# Patient Record
Sex: FEMALE | Race: WHITE | NOT HISPANIC OR LATINO | URBAN - METROPOLITAN AREA
[De-identification: names, ages, dates, MRNs, and addresses within clinical notes are randomized per-mention and may not be internally consistent; named-entity substitution may affect disease eponyms.]

---

## 2017-11-07 ENCOUNTER — APPOINTMENT (RX ONLY)
Dept: URBAN - METROPOLITAN AREA CLINIC 161 | Facility: CLINIC | Age: 72
Setting detail: DERMATOLOGY
End: 2017-11-07

## 2017-11-07 DIAGNOSIS — D18.0 HEMANGIOMA: ICD-10-CM

## 2017-11-07 DIAGNOSIS — L73.8 OTHER SPECIFIED FOLLICULAR DISORDERS: ICD-10-CM

## 2017-11-07 DIAGNOSIS — L57.0 ACTINIC KERATOSIS: ICD-10-CM

## 2017-11-07 DIAGNOSIS — L81.4 OTHER MELANIN HYPERPIGMENTATION: ICD-10-CM

## 2017-11-07 DIAGNOSIS — L82.1 OTHER SEBORRHEIC KERATOSIS: ICD-10-CM

## 2017-11-07 DIAGNOSIS — Z71.89 OTHER SPECIFIED COUNSELING: ICD-10-CM

## 2017-11-07 PROBLEM — I10 ESSENTIAL (PRIMARY) HYPERTENSION: Status: ACTIVE | Noted: 2017-11-07

## 2017-11-07 PROBLEM — L70.0 ACNE VULGARIS: Status: ACTIVE | Noted: 2017-11-07

## 2017-11-07 PROBLEM — D18.01 HEMANGIOMA OF SKIN AND SUBCUTANEOUS TISSUE: Status: ACTIVE | Noted: 2017-11-07

## 2017-11-07 PROBLEM — L55.1 SUNBURN OF SECOND DEGREE: Status: ACTIVE | Noted: 2017-11-07

## 2017-11-07 PROBLEM — D48.5 NEOPLASM OF UNCERTAIN BEHAVIOR OF SKIN: Status: ACTIVE | Noted: 2017-11-07

## 2017-11-07 PROCEDURE — 99203 OFFICE O/P NEW LOW 30 MIN: CPT | Mod: 25

## 2017-11-07 PROCEDURE — ? BIOPSY BY SHAVE METHOD

## 2017-11-07 PROCEDURE — 17000 DESTRUCT PREMALG LESION: CPT

## 2017-11-07 PROCEDURE — 69100 BIOPSY OF EXTERNAL EAR: CPT | Mod: 59

## 2017-11-07 PROCEDURE — 11100: CPT | Mod: 59

## 2017-11-07 PROCEDURE — ? DEFER

## 2017-11-07 PROCEDURE — ? REFERRAL CORRESPONDENCE

## 2017-11-07 PROCEDURE — ? LIQUID NITROGEN

## 2017-11-07 PROCEDURE — 11101: CPT

## 2017-11-07 PROCEDURE — ? COUNSELING

## 2017-11-07 ASSESSMENT — LOCATION DETAILED DESCRIPTION DERM
LOCATION DETAILED: SUPERIOR THORACIC SPINE
LOCATION DETAILED: LEFT CYMBA CONCHA
LOCATION DETAILED: LEFT LATERAL ZYGOMA
LOCATION DETAILED: LEFT SUPERIOR HELIX
LOCATION DETAILED: LEFT SUPERIOR MEDIAL MIDBACK
LOCATION DETAILED: RIGHT CENTRAL MALAR CHEEK
LOCATION DETAILED: RIGHT SUPERIOR HELIX
LOCATION DETAILED: RIGHT DISTAL RADIAL DORSAL FOREARM
LOCATION DETAILED: EPIGASTRIC SKIN
LOCATION DETAILED: RIGHT NASAL DORSUM

## 2017-11-07 ASSESSMENT — LOCATION ZONE DERM
LOCATION ZONE: EAR
LOCATION ZONE: FACE
LOCATION ZONE: ARM
LOCATION ZONE: TRUNK
LOCATION ZONE: NOSE

## 2017-11-07 ASSESSMENT — LOCATION SIMPLE DESCRIPTION DERM
LOCATION SIMPLE: LEFT EAR
LOCATION SIMPLE: UPPER BACK
LOCATION SIMPLE: RIGHT EAR
LOCATION SIMPLE: LEFT LOWER BACK
LOCATION SIMPLE: ABDOMEN
LOCATION SIMPLE: NOSE
LOCATION SIMPLE: LEFT ZYGOMA
LOCATION SIMPLE: RIGHT CHEEK
LOCATION SIMPLE: RIGHT FOREARM

## 2017-11-07 NOTE — PROCEDURE: BIOPSY BY SHAVE METHOD
Electrodesiccation Text: The wound bed was treated with electrodesiccation after the biopsy was performed.
Triangulation (Location Of Lesion In Relation To Distance From Anatomical Landmarks): 6mm
Type Of Destruction Used: Curettage
Destruction After The Procedure: No
Electrodesiccation And Curettage Text: The wound bed was treated with electrodesiccation and curettage after the biopsy was performed.
Curettage Text: The wound bed was treated with curettage after the biopsy was performed.
Consent: Written consent was obtained and risks were reviewed including but not limited to scarring, infection, bleeding, scabbing, incomplete removal, nerve damage and allergy to anesthesia.
Lab Facility: [id number]
Biopsy Method: Dermablade
Lab: [id number]
X Size Of Lesion In Cm: 0
Hemostasis: Drysol
Cryotherapy Text: The wound bed was treated with cryotherapy after the biopsy was performed.
Biopsy Type: H and E
Anesthesia Volume In Cc (Will Not Render If 0): 0.5
Triangulation (Location Of Lesion In Relation To Distance From Anatomical Landmarks): 5mm
Billing Type: Third-Party Bill
Lab Facility: [id number]
Triangulation (Location Of Lesion In Relation To Distance From Anatomical Landmarks): 4mm
Notification Instructions: Patient will be notified of biopsy results. However, patient instructed to call the office if not contacted within 2 weeks.
Size Of Lesion In Cm: 0.4
Size Of Lesion In Cm: 0.6
Dressing: bandage
Detail Level: Detailed
Anesthesia Type: 1% lidocaine with epinephrine
Wound Care: Bacitracin
Silver Nitrate Text: The wound bed was treated with silver nitrate after the biopsy was performed.
Lab: [id number]
Billing Type: Third-Party Bill
Post-Care Instructions: I reviewed with the patient in detail post-care instructions. Patient is to keep the biopsy site dry overnight, and then apply bacitracin twice daily until healed. Patient may apply hydrogen peroxide soaks to remove any crusting.
Lab Facility: [id number]

## 2017-11-07 NOTE — PROCEDURE: LIQUID NITROGEN
Detail Level: Zone
Consent: The patient's consent was obtained including but not limited to risks of crusting, scabbing, blistering, scarring, darker or lighter pigmentary change, recurrence, incomplete removal and infection.
Duration Of Freeze Thaw-Cycle (Seconds): 0
Render Post-Care Instructions In Note?: no

## 2017-11-07 NOTE — PROCEDURE: DEFER
Detail Level: Zone
Instructions (Optional): Refer to Yessi for PDT on face and ear rims X 2 treatments with double scrub and 2 hour incubation at least 1 month apart
Procedure To Be Performed At Next Visit: PDT Blue Light

## 2017-12-06 ENCOUNTER — APPOINTMENT (RX ONLY)
Dept: URBAN - METROPOLITAN AREA CLINIC 161 | Facility: CLINIC | Age: 72
Setting detail: DERMATOLOGY
End: 2017-12-06

## 2017-12-06 DIAGNOSIS — F42.4 EXCORIATION (SKIN-PICKING) DISORDER: ICD-10-CM

## 2017-12-06 DIAGNOSIS — L82.1 OTHER SEBORRHEIC KERATOSIS: ICD-10-CM

## 2017-12-06 DIAGNOSIS — L57.0 ACTINIC KERATOSIS: ICD-10-CM

## 2017-12-06 PROBLEM — S00.80XA UNSPECIFIED SUPERFICIAL INJURY OF OTHER PART OF HEAD, INITIAL ENCOUNTER: Status: ACTIVE | Noted: 2017-12-06

## 2017-12-06 PROCEDURE — ? PATIENT SPECIFIC COUNSELING

## 2017-12-06 PROCEDURE — ? LIQUID NITROGEN

## 2017-12-06 PROCEDURE — ? COUNSELING

## 2017-12-06 PROCEDURE — 17000 DESTRUCT PREMALG LESION: CPT

## 2017-12-06 PROCEDURE — 99212 OFFICE O/P EST SF 10 MIN: CPT | Mod: 25

## 2017-12-06 PROCEDURE — 17003 DESTRUCT PREMALG LES 2-14: CPT

## 2017-12-06 ASSESSMENT — LOCATION SIMPLE DESCRIPTION DERM
LOCATION SIMPLE: RIGHT ZYGOMA
LOCATION SIMPLE: LEFT ZYGOMA
LOCATION SIMPLE: RIGHT TEMPLE

## 2017-12-06 ASSESSMENT — LOCATION DETAILED DESCRIPTION DERM
LOCATION DETAILED: LEFT LATERAL ZYGOMA
LOCATION DETAILED: RIGHT CENTRAL TEMPLE
LOCATION DETAILED: RIGHT CENTRAL ZYGOMA
LOCATION DETAILED: RIGHT MEDIAL TEMPLE

## 2017-12-06 ASSESSMENT — LOCATION ZONE DERM: LOCATION ZONE: FACE

## 2017-12-06 NOTE — PROCEDURE: LIQUID NITROGEN
Render Post-Care Instructions In Note?: no
Consent: The patient's consent was obtained including but not limited to risks of crusting, scabbing, blistering, scarring, darker or lighter pigmentary change, recurrence, incomplete removal and infection.
Detail Level: Detailed
Duration Of Freeze Thaw-Cycle (Seconds): 0

## 2018-04-25 ENCOUNTER — APPOINTMENT (RX ONLY)
Dept: URBAN - METROPOLITAN AREA CLINIC 161 | Facility: CLINIC | Age: 73
Setting detail: DERMATOLOGY
End: 2018-04-25

## 2018-04-25 DIAGNOSIS — L81.4 OTHER MELANIN HYPERPIGMENTATION: ICD-10-CM

## 2018-04-25 DIAGNOSIS — Z85.828 PERSONAL HISTORY OF OTHER MALIGNANT NEOPLASM OF SKIN: ICD-10-CM

## 2018-04-25 DIAGNOSIS — L82.1 OTHER SEBORRHEIC KERATOSIS: ICD-10-CM

## 2018-04-25 DIAGNOSIS — L73.8 OTHER SPECIFIED FOLLICULAR DISORDERS: ICD-10-CM

## 2018-04-25 DIAGNOSIS — L57.0 ACTINIC KERATOSIS: ICD-10-CM

## 2018-04-25 PROBLEM — E05.90 THYROTOXICOSIS, UNSPECIFIED WITHOUT THYROTOXIC CRISIS OR STORM: Status: ACTIVE | Noted: 2018-04-25

## 2018-04-25 PROCEDURE — ? PATIENT SPECIFIC COUNSELING

## 2018-04-25 PROCEDURE — ? TREATMENT REGIMEN

## 2018-04-25 PROCEDURE — 99213 OFFICE O/P EST LOW 20 MIN: CPT

## 2018-04-25 PROCEDURE — ? OBSERVATION

## 2018-04-25 PROCEDURE — ? COUNSELING

## 2018-04-25 ASSESSMENT — LOCATION SIMPLE DESCRIPTION DERM
LOCATION SIMPLE: RIGHT PRETIBIAL REGION
LOCATION SIMPLE: LEFT LOWER BACK
LOCATION SIMPLE: RIGHT UPPER BACK
LOCATION SIMPLE: LEFT UPPER BACK
LOCATION SIMPLE: NOSE
LOCATION SIMPLE: LEFT POSTERIOR UPPER ARM
LOCATION SIMPLE: ABDOMEN
LOCATION SIMPLE: RIGHT HAND
LOCATION SIMPLE: RIGHT LOWER BACK
LOCATION SIMPLE: SCALP

## 2018-04-25 ASSESSMENT — LOCATION DETAILED DESCRIPTION DERM
LOCATION DETAILED: LEFT INFERIOR UPPER BACK
LOCATION DETAILED: RIGHT LATERAL ABDOMEN
LOCATION DETAILED: EPIGASTRIC SKIN
LOCATION DETAILED: LEFT SUPERIOR UPPER BACK
LOCATION DETAILED: LEFT PROXIMAL LATERAL POSTERIOR UPPER ARM
LOCATION DETAILED: RIGHT SUPERIOR MEDIAL MIDBACK
LOCATION DETAILED: LEFT INFERIOR LATERAL MIDBACK
LOCATION DETAILED: RIGHT SUPERIOR PARIETAL SCALP
LOCATION DETAILED: RIGHT NASAL DORSUM
LOCATION DETAILED: RIGHT DISTAL PRETIBIAL REGION
LOCATION DETAILED: RIGHT RADIAL DORSAL HAND
LOCATION DETAILED: RIGHT SUPERIOR UPPER BACK
LOCATION DETAILED: LEFT RIB CAGE

## 2018-04-25 ASSESSMENT — LOCATION ZONE DERM
LOCATION ZONE: ARM
LOCATION ZONE: LEG
LOCATION ZONE: NOSE
LOCATION ZONE: TRUNK
LOCATION ZONE: HAND
LOCATION ZONE: SCALP

## 2018-04-25 NOTE — PROCEDURE: TREATMENT REGIMEN
Plan: Patient is leaving and will be returning in the fall she plans to call Yessi to schedule PDT to the face in October
Detail Level: Zone

## 2018-04-25 NOTE — PROCEDURE: PATIENT SPECIFIC COUNSELING
Detail Level: Simple
NMSC have been txed by Dr Anderson and Dr Corea and has had 3 skin cancers on the scalp, most recent txed by Dr Corea with prolonged Efudex.\\nRec she f/u Dr Corea for final check in the fall, upon returning to [place].

## 2024-01-08 ENCOUNTER — OFFICE VISIT (OUTPATIENT)
Dept: URBAN - METROPOLITAN AREA CLINIC 54 | Facility: CLINIC | Age: 79
End: 2024-01-08
Payer: MEDICARE

## 2024-01-08 DIAGNOSIS — H35.363 DRUSEN (DEGENERATIVE) OF MACULA, BILATERAL: Primary | ICD-10-CM

## 2024-01-08 DIAGNOSIS — H43.813 VITREOUS DEGENERATION, BILATERAL: ICD-10-CM

## 2024-01-08 PROCEDURE — 99204 OFFICE O/P NEW MOD 45 MIN: CPT | Performed by: OPHTHALMOLOGY

## 2024-01-08 PROCEDURE — 92134 CPTRZ OPH DX IMG PST SGM RTA: CPT | Performed by: OPHTHALMOLOGY

## 2024-01-08 ASSESSMENT — INTRAOCULAR PRESSURE
OD: 12
OS: 14

## 2024-05-08 PROBLEM — Z00.00 ENCOUNTER FOR PREVENTIVE HEALTH EXAMINATION: Status: ACTIVE | Noted: 2024-05-08

## 2024-06-04 PROBLEM — I67.1 BRAIN ANEURYSM: Status: ACTIVE | Noted: 2024-06-04

## 2024-06-04 PROBLEM — K21.9 CHRONIC GERD: Status: RESOLVED | Noted: 2024-06-04 | Resolved: 2024-06-04

## 2024-06-04 PROBLEM — Z86.79 HISTORY OF PULMONARY HYPERTENSION: Status: RESOLVED | Noted: 2024-06-04 | Resolved: 2024-06-04

## 2024-06-04 PROBLEM — Z87.19 HISTORY OF ULCERATIVE COLITIS: Status: RESOLVED | Noted: 2024-06-04 | Resolved: 2024-06-04

## 2024-06-04 PROBLEM — Z86.79 HISTORY OF HEART FAILURE: Status: RESOLVED | Noted: 2024-06-04 | Resolved: 2024-06-04

## 2024-06-04 PROBLEM — Z86.79 HISTORY OF ESSENTIAL HYPERTENSION: Status: RESOLVED | Noted: 2024-06-04 | Resolved: 2024-06-04

## 2024-06-04 PROBLEM — G47.30 SLEEP APNEA: Status: RESOLVED | Noted: 2024-06-04 | Resolved: 2024-06-04

## 2024-06-04 PROBLEM — F32.A DEPRESSION: Status: RESOLVED | Noted: 2024-06-04 | Resolved: 2024-06-04

## 2024-06-04 PROBLEM — Z87.2 HISTORY OF ECZEMA: Status: RESOLVED | Noted: 2024-06-04 | Resolved: 2024-06-04

## 2024-06-04 PROBLEM — Z78.9 NEVER SMOKED TOBACCO: Status: ACTIVE | Noted: 2024-06-04

## 2024-06-04 PROBLEM — I63.9 STROKE: Status: RESOLVED | Noted: 2024-06-04 | Resolved: 2024-06-04

## 2024-06-04 PROBLEM — Z86.69 HISTORY OF BENIGN ESSENTIAL TREMOR: Status: RESOLVED | Noted: 2024-06-04 | Resolved: 2024-06-04

## 2024-06-04 PROBLEM — Z78.9 NEVER SMOKED CIGARETTES: Status: ACTIVE | Noted: 2024-06-04

## 2024-06-04 PROBLEM — Z86.39 HISTORY OF HYPOTHYROIDISM: Status: RESOLVED | Noted: 2024-06-04 | Resolved: 2024-06-04

## 2024-06-04 PROBLEM — M81.0 OSTEOPOROSIS: Status: RESOLVED | Noted: 2024-06-04 | Resolved: 2024-06-04

## 2024-06-04 PROBLEM — Z78.9 SOCIAL ALCOHOL USE: Status: ACTIVE | Noted: 2024-06-04

## 2024-06-04 RX ORDER — ESOMEPRAZOLE MAGNESIUM 40 MG/1
40 CAPSULE, DELAYED RELEASE ORAL
Refills: 0 | Status: ACTIVE | COMMUNITY

## 2024-06-04 RX ORDER — MULTIVIT-MIN/FOLIC/VIT K/LYCOP 400-300MCG
500 TABLET ORAL
Refills: 0 | Status: ACTIVE | COMMUNITY

## 2024-06-04 RX ORDER — SERTRALINE HYDROCHLORIDE 50 MG/1
50 TABLET, FILM COATED ORAL
Refills: 0 | Status: ACTIVE | COMMUNITY

## 2024-06-04 RX ORDER — LOSARTAN POTASSIUM 50 MG/1
50 TABLET, FILM COATED ORAL
Refills: 0 | Status: ACTIVE | COMMUNITY

## 2024-06-04 RX ORDER — ELECTROLYTES/DEXTROSE
SOLUTION, ORAL ORAL
Refills: 0 | Status: ACTIVE | COMMUNITY

## 2024-06-04 RX ORDER — MAGNESIUM 200 MG
200 TABLET ORAL
Refills: 0 | Status: ACTIVE | COMMUNITY

## 2024-06-04 RX ORDER — UBIDECARENONE 50 MG
600 CAPSULE ORAL
Refills: 0 | Status: ACTIVE | COMMUNITY

## 2024-06-04 RX ORDER — SPIRONOLACTONE 25 MG/1
25 TABLET ORAL
Refills: 0 | Status: ACTIVE | COMMUNITY

## 2024-06-04 RX ORDER — CYANOCOBALAMIN (VITAMIN B-12) 1000 MCG
TABLET ORAL
Refills: 0 | Status: ACTIVE | COMMUNITY

## 2024-06-04 RX ORDER — VEDOLIZUMAB 300 MG/5ML
300 INJECTION, POWDER, LYOPHILIZED, FOR SOLUTION INTRAVENOUS
Refills: 0 | Status: ACTIVE | COMMUNITY

## 2024-06-04 RX ORDER — RISEDRONATE SODIUM 129; 21 MG/1; MG/1
150 TABLET, FILM COATED ORAL
Refills: 0 | Status: ACTIVE | COMMUNITY

## 2024-06-04 RX ORDER — LEVOTHYROXINE SODIUM 112 UG/1
112 TABLET ORAL
Refills: 0 | Status: ACTIVE | COMMUNITY

## 2024-06-06 ENCOUNTER — APPOINTMENT (OUTPATIENT)
Dept: NEUROSURGERY | Facility: CLINIC | Age: 79
End: 2024-06-06
Payer: MEDICARE

## 2024-06-06 DIAGNOSIS — Z78.9 OTHER SPECIFIED HEALTH STATUS: ICD-10-CM

## 2024-06-06 DIAGNOSIS — Z86.79 PERSONAL HISTORY OF OTHER DISEASES OF THE CIRCULATORY SYSTEM: ICD-10-CM

## 2024-06-06 DIAGNOSIS — G47.30 SLEEP APNEA, UNSPECIFIED: ICD-10-CM

## 2024-06-06 DIAGNOSIS — K21.9 GASTRO-ESOPHAGEAL REFLUX DISEASE W/OUT ESOPHAGITIS: ICD-10-CM

## 2024-06-06 DIAGNOSIS — Z85.828 PERSONAL HISTORY OF OTHER MALIGNANT NEOPLASM OF SKIN: ICD-10-CM

## 2024-06-06 DIAGNOSIS — Z86.39 PERSONAL HISTORY OF OTHER ENDOCRINE, NUTRITIONAL AND METABOLIC DISEASE: ICD-10-CM

## 2024-06-06 DIAGNOSIS — I63.9 CEREBRAL INFARCTION, UNSPECIFIED: ICD-10-CM

## 2024-06-06 DIAGNOSIS — Z86.69 PERSONAL HISTORY OF OTHER DISEASES OF THE NERVOUS SYSTEM AND SENSE ORGANS: ICD-10-CM

## 2024-06-06 DIAGNOSIS — M81.0 AGE-RELATED OSTEOPOROSIS W/OUT CURRENT PATHOLOGICAL FRACTURE: ICD-10-CM

## 2024-06-06 DIAGNOSIS — Z87.2 PERSONAL HISTORY OF DISEASES OF THE SKIN AND SUBCUTANEOUS TISSUE: ICD-10-CM

## 2024-06-06 DIAGNOSIS — I67.1 CEREBRAL ANEURYSM, NONRUPTURED: ICD-10-CM

## 2024-06-06 DIAGNOSIS — F32.A DEPRESSION, UNSPECIFIED: ICD-10-CM

## 2024-06-06 DIAGNOSIS — Z87.19 PERSONAL HISTORY OF OTHER DISEASES OF THE DIGESTIVE SYSTEM: ICD-10-CM

## 2024-06-06 PROCEDURE — 99203 OFFICE O/P NEW LOW 30 MIN: CPT

## 2024-06-06 RX ORDER — APIXABAN 5 MG/1
5 TABLET, FILM COATED ORAL
Refills: 0 | Status: ACTIVE | COMMUNITY

## 2024-06-06 NOTE — ASSESSMENT
[FreeTextEntry1] : I, Dr. Callejas, personally performed the evaluation and management (E/M) services for this new patient who presents today to discuss treatment for aneurysm. That E/M includes conducting the examination, assessing all new/exacerbated conditions, and establishing a new plan of care. Today, my ACP, Zulma Whaley, was here to observe my evaluation and management services for this new problem/exacerbated condition to be followed going forward.  PLAN: - recommended angiogram to treat aneurysm. Possible craniotomy for clipping - PST paperwork provided to the pt and  and reviewed. They both verbalized understanding - Zakia Robbins to call Pt tomorrow to schedule Angio. She will need Cardiac clearance

## 2024-06-06 NOTE — ADDENDUM
[FreeTextEntry1] :   Patient Name: Tali Donohue   Chief Complaint (CC):   - Concern of increasing size of cerebral aneurysm   History of Present Illness:   - The patient is a 78-year-old female with significant past medical conditions like hip replacement, multiple skin cancer extractions, stroke-induced atrial fibrillation repair in 2020, along with an identified 2mm middle cerebral aneurysm in the same year. The aneurysm has increased in size from 2mm to 4mm this year. Current ongoing treatment includes infusions every eight weeks for ulcerative colitis. An attempt to coil the aneurysm at the [facility] was unsuccessful. Recent encounter with COVID due to exposure from visiting [place] where she required 3l oxygen support for a month.   Past Medical History (PMH):   - The patient has a history of hip replacement, skin cancer, stroke, cerebral aneurysm, ulcerative colitis, and COVID-19.   Family History (FH):   - Neither family history nor hereditary conditions mentioned in the conversation.   Social History (SH):   - The patient was an  by profession, currently lives in [place] during the winter and has two grown children. Never being a smoker, she lives healthily with her .   Medications:   - Current medications are: Actonel, ascorbic acid (Vitamin C), calcium, Cozaar, Cyanocobalamin, Entyvio (for UC), magnesium, multivitamin, Nexium, spironolactone, Zocor, Zoloft and Eliquis (for thrombus).   Allergies:   - No known allergies mentioned during the conversation.   Review of Systems (ROS):   - There are no symptoms of heart conditions such as shortness of breath or chest pain, nor any other diseases or symptoms mentioned or apparent.   Physical Examination (PE):   - Actual physical examination was not conducted during this conversation. Details can't be provided.   Laboratory/Data Review:   - FOB CAT scan was performed earlier this year, which evidenced enlargement of the cerebral aneurysm from 2 mm to 4 mm.   Assessment:   - A concern primarily revolves around the recently enlarged aneurysm in the left middle cerebral artery. This, in context with patient's history of stroke, and unsuccessful coiling attempt, puts her at elevated risk for cerebral incidents.   Plan:   - Plan is to consider another attempt for coiling this aneurysm under general anesthesia. In a case where the coiling attempt fails, it is advised to opt for surgical intervention. The treatment has to be preceded by medical clearance from relevant specialists, as part of hospital preoperative process.   Preventive Health:   - Not enough information given for preventive health assessment   Patient Name: Tali Donohue   Chief Complaint (CC):   - Upon my review of the patient's angiogram from a CAT scan showed presence of an aneurysm.   History of Present Illness:   - The patient underwent an angiographic CAT scan which resulted in a 3D anatomical image. The image revealed an aneurysm that is challenging to navigate due to a number of curves in the patient's anatomy. This, paired with potential vascular narrowing, increases the complexity of the case. Despite the presence of the aneurysm being identified, it remains difficult to precisely visualize in certain views due to its small size.   Past Medical History (PMH):   - Data not present in the conversation.   Family History (FH):   - Data not present in the conversation.   Social History (SH):   - Data not present in the conversation.   Medications:   - Data not present in the conversation.   Allergies:   - Data not present in the conversation.   Review of Systems (ROS):   - Data not present in the conversation.   Physical Examination (PE):   - Data not present in the conversation.   Laboratory/Data Review:   - A relevant CAT scan was performed. The scan showed 3D anatomical image with prominent features being an aneurysm, complex vascular architecture with many turns and possible vascular narrowing. However, the aneurysm is not clearly visible from all angles due to its small size.   Assessment:   - The patient's case is complex due to the small size and location of the aneurysm, as well as potential vascular narrowing. The catheter navigation through all the turns in the vascular network is challenging.   Plan:   - Firstly, I plan to try non-invasive solutions. The catheter is designed for such situations, but maintaining stability while inserting a coil can be challenging. Although operating on this does not seem remarkably hard, I prefer exhausting all minimally invasive methods first. If unsuccessful, surgical intervention will be considered. I also plan to consult with Dr. Orozco for additional perspectives. A follow up appointment with a cardiologist on July 3 has also been scheduled. The patient will be informed to notify their cardiologist in case of an operation requirement.   Preventive Health:   - Not available from the conversion.

## 2024-06-06 NOTE — PHYSICAL EXAM
[General Appearance - Alert] : alert [General Appearance - Well Nourished] : well nourished [General Appearance - Well-Appearing] : healthy appearing [Oriented To Time, Place, And Person] : oriented to person, place, and time [Affect] : the affect was normal [Memory Recent] : recent memory was not impaired [Memory Remote] : remote memory was not impaired [Person] : oriented to person [Place] : oriented to place [Time] : oriented to time [Short Term Intact] : short term memory intact [Remote Intact] : remote memory intact [Registration Intact] : recent registration memory intact [Cranial Nerves Oculomotor (III)] : extraocular motion intact [Cranial Nerves Facial (VII)] : face symmetrical [Cranial Nerves Vestibulocochlear (VIII)] : hearing was intact bilaterally [Cranial Nerves Accessory (XI - Cranial And Spinal)] : head turning and shoulder shrug symmetric [Motor Tone] : muscle tone was normal in all four extremities [Cranial Nerves Hypoglossal (XII)] : there was no tongue deviation with protrusion [Motor Strength] : muscle strength was normal in all four extremities [5] : S1 ankle flexors 5/5 [Abnormal Walk] : normal gait [Balance] : balance was intact [Intact] : all sensory within normal limits bilaterally [PERRL With Normal Accommodation] : pupils were equal in size, round, reactive to light, with normal accommodation [Extraocular Movements] : extraocular movements were intact [Cranial Nerves Trigeminal (V)] : facial sensation intact symmetrically [FreeTextEntry5] : bilateral right field cut

## 2024-06-06 NOTE — HISTORY OF PRESENT ILLNESS
[> 3 months] : more  than 3 months [FreeTextEntry1] : Growing LEFT MCA ANR [de-identified] : The patient is a 78 year old female who presented to [facility] in [place] in 2020 after she developed change in vision and was dx with a stroke and PFO, PFO was closed. Again this past January, she had changes in her vision and was Dx with a right vitreous hemorrhage. During the w/u a CTA was done which showed a LEFT M2 MCA ANR which was enlarged from a CTA in March 2020.   1/7/24: CTA Head and neck: 4mm LEFT M2 ANR 2/2/2024: MRI brain, MRA brain and neck: 4mm LEFT M2 MCA aneurysm with rim enhancement on the vessel wall suggesting less stable morphology. no stenosis. Old LEFT PCA infarct. Done at [facility] 3/4/2024 OSH Cerebral Angiogram done at [facility]  TODAY: 6/6/24: comes to office to review images and discuss options to treat ANR.

## 2024-06-06 NOTE — REASON FOR VISIT
[New Patient Visit] : a new patient visit [Spouse] : spouse [FreeTextEntry1] : enlarging left M2 aneurysm

## 2024-07-21 ENCOUNTER — TRANSCRIPTION ENCOUNTER (OUTPATIENT)
Age: 79
End: 2024-07-21

## 2024-07-22 ENCOUNTER — TRANSCRIPTION ENCOUNTER (OUTPATIENT)
Age: 79
End: 2024-07-22

## 2024-07-23 ENCOUNTER — APPOINTMENT (OUTPATIENT)
Dept: NEUROSURGERY | Facility: HOSPITAL | Age: 79
End: 2024-07-23

## 2024-07-24 ENCOUNTER — TRANSCRIPTION ENCOUNTER (OUTPATIENT)
Age: 79
End: 2024-07-24

## 2024-07-25 ENCOUNTER — TRANSCRIPTION ENCOUNTER (OUTPATIENT)
Age: 79
End: 2024-07-25

## 2024-07-29 ENCOUNTER — NON-APPOINTMENT (OUTPATIENT)
Age: 79
End: 2024-07-29

## 2024-08-01 ENCOUNTER — APPOINTMENT (OUTPATIENT)
Dept: NEUROSURGERY | Facility: CLINIC | Age: 79
End: 2024-08-01
Payer: MEDICARE

## 2024-08-01 DIAGNOSIS — I67.1 CEREBRAL ANEURYSM, NONRUPTURED: ICD-10-CM

## 2024-08-01 PROCEDURE — 99441: CPT | Mod: 93

## 2024-08-07 ENCOUNTER — APPOINTMENT (OUTPATIENT)
Dept: NEUROSURGERY | Facility: CLINIC | Age: 79
End: 2024-08-07

## 2024-08-07 PROBLEM — Z87.898 NO POST-OP COMPLICATIONS: Status: ACTIVE | Noted: 2024-08-07

## 2024-08-07 PROBLEM — Z98.890 POST-OPERATIVE STATE: Status: ACTIVE | Noted: 2024-08-07

## 2024-08-07 PROBLEM — Z48.02 REMOVAL OF STAPLES: Status: ACTIVE | Noted: 2024-08-07

## 2024-08-07 PROCEDURE — 99024 POSTOP FOLLOW-UP VISIT: CPT

## 2024-08-07 NOTE — HISTORY OF PRESENT ILLNESS
[FreeTextEntry1] : The patient is a 78 years old lady with a known incidental unruptured M3 aneurysm on the left side.  The unusual distal localization along with documented increase in size over the last years (from 3 to 4 mm)  led to the indication for treatment in this patient, which is in very good clinical condition.  A previous endovascular treatment attempt at an outside institution was unsuccessful.  We discussed the treatment options with the patient, and agreed with  her to proceed with the second endovascular treatment attempt.  On July 22, 2024, we performed the angiogram and could get to the distal aneurysm with a micro catheter.  However, coiling of the aneurysm was not possible due to the wide neck, and repetitive  coil protrusion in the parent vessel.  We decided then, after discussion with the patient and her family, to move ahead with the surgical clipping.    TTM 7/31/24: Pt is home.  is with her. No bleeding, no drainage, states incision and drain area is no red or swollen. no fever. eating small amounts. Drinking, urinating and having BM well. She is walking around her home slowly. Denies pain or any neuro changes.  TODAY 8/7/24: Pt reports she is tired and has not gotten her energy back. She reports she is eating, drinking, urinating and having normal BM. PT is coming to her home once a week

## 2024-08-07 NOTE — PHYSICAL EXAM
[General Appearance - Alert] : alert [General Appearance - In No Acute Distress] : in no acute distress [Clean] : clean [Dry] : dry [Healing Well] : healing well [Palpable Crepitus] : without palpable crepitus [Staple Intact] : closed with intact staples [No Drainage] : without drainage [Normal Skin] : normal [Erythema] : not erythematous [Tender] : not tender [Warm] : not warm [Indurated] : not indurated [Oriented To Time, Place, And Person] : oriented to person, place, and time [Affect] : the affect was normal [Memory Recent] : recent memory was not impaired [Memory Remote] : remote memory was not impaired [Person] : oriented to person [Place] : oriented to place [Time] : oriented to time [Short Term Intact] : short term memory intact [Remote Intact] : remote memory intact [Span Intact] : the attention span was normal [Concentration Intact] : normal concentrating ability [Visual Intact] : visual attention was ~T not ~L decreased [Fluency] : fluency intact [Comprehension] : comprehension intact [Reading] : reading intact [Current Events] : adequate knowledge of current events [Vocabulary] : adequate range of vocabulary [Cranial Nerves Optic (II)] : visual acuity intact bilaterally,  pupils equal round and reactive to light [Cranial Nerves Oculomotor (III)] : extraocular motion intact [Cranial Nerves Trigeminal (V)] : facial sensation intact symmetrically [Cranial Nerves Facial (VII)] : face symmetrical [Cranial Nerves Vestibulocochlear (VIII)] : hearing was intact bilaterally [Cranial Nerves Accessory (XI - Cranial And Spinal)] : head turning and shoulder shrug symmetric [Cranial Nerves Hypoglossal (XII)] : there was no tongue deviation with protrusion [Motor Tone] : muscle tone was normal in all four extremities [Motor Strength] : muscle strength was normal in all four extremities [Involuntary Movements] : no involuntary movements were seen [No Muscle Atrophy] : normal bulk in all four extremities [Abnormal Walk] : normal gait [PERRL With Normal Accommodation] : pupils were equal in size, round, reactive to light, with normal accommodation [Extraocular Movements] : extraocular movements were intact [Full Visual Field] : full visual field

## 2024-08-07 NOTE — REASON FOR VISIT
[Spouse] : spouse [de-identified] :  Left pterional craniotomy for clipping of the M3 aneurysm with intr-op angiogram [de-identified] : 7/23/24

## 2024-08-07 NOTE — ASSESSMENT
[FreeTextEntry1] :  She is stable and healing appropriately. She is an appropriate candidate for staple removal today. Staples removed and incision was well approximated with no drainage, redness or bleeding Instructed to: - RTC to see Dr. Callejas in 2-3 weeks - Can leave incision open to air. - Gently wash surgical sites daily with baby shampoo and water. Pat dry. - No swimming or soaking in bathtub for 6 weeks post-operatively or until wounds have fully healed. - Avoid applying cream/ointment directly to surgical incisions for 6 weeks post-op. - Continue monitoring for signs of infection including increased pain, redness, swelling, fever (temperature > 100.4 F), or yellow/green/brown drainage. - Please call immediately with any of these symptoms. During office hours, call your our office at [phone]. Call 9-1-1 for any life-threatening signs or symptoms.

## 2024-08-08 NOTE — REASON FOR VISIT
[Home] : at home, [unfilled] , at the time of the visit. [Medical Office: ([facility])___] : at the medical office located in  [Verbal consent obtained from patient] : the patient, [unfilled] [de-identified] :  Left pterional craniotomy for clipping of the M3 aneurysm with intr-op angiogram [de-identified] : 7/25/24

## 2024-08-08 NOTE — ASSESSMENT
[FreeTextEntry1] : The patient is doing well  s/p aneurysm clipping. She will be seen in the office in 1 weeks for incisional check and possible staple removal.   Total time spent with the patient was 9 minutes

## 2024-08-08 NOTE — REASON FOR VISIT
[Home] : at home, [unfilled] , at the time of the visit. [Medical Office: ([facility])___] : at the medical office located in  [Verbal consent obtained from patient] : the patient, [unfilled] [de-identified] :  Left pterional craniotomy for clipping of the M3 aneurysm with intr-op angiogram [de-identified] : 7/25/24

## 2024-08-08 NOTE — HISTORY OF PRESENT ILLNESS
[FreeTextEntry1] : The patient is a 78 years old lady with a known incidental unruptured M3 aneurysm on the left side.  The unusual distal localization along with documented increase in size over the last years (from 3 to 4 mm)  led to the indication for treatment in this patient, which is in very good clinical condition.  A previous endovascular treatment attempt at an outside institution was unsuccessful.  We discussed the treatment options with the patient, and agreed with  her to proceed with the second endovascular treatment attempt.  On July 22, 2024, we performed the angiogram and could get to the distal aneurysm with a micro catheter.  However, coiling of the aneurysm was not possible due to the wide neck, and repetitive  coil protrusion in the parent vessel.  We decided then, after discussion with the patient and her family, to move ahead with the surgical clipping.    TODAY TTM 7/31/24: Pt is home.  is with her. No bleeding, no drainage, states incision and drain area is no red or swollem. no fever. eating small amounts. Drinking, urinating and having BM well. She is walking around her home slowly. Denies pain or any neuro changes.

## 2024-08-27 PROBLEM — F32.A DEPRESSION: Status: RESOLVED | Noted: 2024-06-04 | Resolved: 2024-08-27

## 2024-08-27 PROBLEM — Z86.69 HISTORY OF BENIGN ESSENTIAL TREMOR: Status: RESOLVED | Noted: 2024-06-04 | Resolved: 2024-08-27

## 2024-08-27 PROBLEM — Z86.39 HISTORY OF HYPERLIPIDEMIA: Status: RESOLVED | Noted: 2024-06-04 | Resolved: 2024-08-27

## 2024-08-27 PROBLEM — M81.0 OSTEOPOROSIS: Status: RESOLVED | Noted: 2024-06-04 | Resolved: 2024-08-27

## 2024-08-27 PROBLEM — Z86.79 HISTORY OF PULMONARY HYPERTENSION: Status: RESOLVED | Noted: 2024-06-04 | Resolved: 2024-08-27

## 2024-08-27 PROBLEM — Z87.2 HISTORY OF ECZEMA: Status: RESOLVED | Noted: 2024-06-04 | Resolved: 2024-08-27

## 2024-08-27 PROBLEM — Z86.79 HISTORY OF HEART FAILURE: Status: RESOLVED | Noted: 2024-06-04 | Resolved: 2024-08-27

## 2024-08-27 PROBLEM — K21.9 CHRONIC GERD: Status: RESOLVED | Noted: 2024-06-04 | Resolved: 2024-08-27

## 2024-08-27 PROBLEM — Z86.79 HISTORY OF ESSENTIAL HYPERTENSION: Status: RESOLVED | Noted: 2024-06-04 | Resolved: 2024-08-27

## 2024-08-27 PROBLEM — Z87.19 HISTORY OF ULCERATIVE COLITIS: Status: RESOLVED | Noted: 2024-06-04 | Resolved: 2024-08-27

## 2024-08-29 ENCOUNTER — APPOINTMENT (OUTPATIENT)
Dept: NEUROSURGERY | Facility: CLINIC | Age: 79
End: 2024-08-29
Payer: MEDICARE

## 2024-08-29 VITALS
TEMPERATURE: 97.3 F | OXYGEN SATURATION: 95 % | DIASTOLIC BLOOD PRESSURE: 67 MMHG | WEIGHT: 155 LBS | RESPIRATION RATE: 18 BRPM | SYSTOLIC BLOOD PRESSURE: 97 MMHG | HEART RATE: 76 BPM | BODY MASS INDEX: 28.52 KG/M2 | HEIGHT: 62 IN

## 2024-08-29 DIAGNOSIS — I67.1 CEREBRAL ANEURYSM, NONRUPTURED: ICD-10-CM

## 2024-08-29 DIAGNOSIS — Z86.79 PERSONAL HISTORY OF OTHER DISEASES OF THE CIRCULATORY SYSTEM: ICD-10-CM

## 2024-08-29 DIAGNOSIS — Z87.898 PERSONAL HISTORY OF OTHER SPECIFIED CONDITIONS: ICD-10-CM

## 2024-08-29 DIAGNOSIS — M81.0 AGE-RELATED OSTEOPOROSIS W/OUT CURRENT PATHOLOGICAL FRACTURE: ICD-10-CM

## 2024-08-29 DIAGNOSIS — Z98.890 OTHER SPECIFIED POSTPROCEDURAL STATES: ICD-10-CM

## 2024-08-29 DIAGNOSIS — F32.A DEPRESSION, UNSPECIFIED: ICD-10-CM

## 2024-08-29 DIAGNOSIS — Z87.19 PERSONAL HISTORY OF OTHER DISEASES OF THE DIGESTIVE SYSTEM: ICD-10-CM

## 2024-08-29 DIAGNOSIS — Z86.39 PERSONAL HISTORY OF OTHER ENDOCRINE, NUTRITIONAL AND METABOLIC DISEASE: ICD-10-CM

## 2024-08-29 DIAGNOSIS — Z78.9 OTHER SPECIFIED HEALTH STATUS: ICD-10-CM

## 2024-08-29 DIAGNOSIS — Z86.69 PERSONAL HISTORY OF OTHER DISEASES OF THE NERVOUS SYSTEM AND SENSE ORGANS: ICD-10-CM

## 2024-08-29 DIAGNOSIS — K21.9 GASTRO-ESOPHAGEAL REFLUX DISEASE W/OUT ESOPHAGITIS: ICD-10-CM

## 2024-08-29 DIAGNOSIS — E03.9 HYPOTHYROIDISM, UNSPECIFIED: ICD-10-CM

## 2024-08-29 DIAGNOSIS — Z87.2 PERSONAL HISTORY OF DISEASES OF THE SKIN AND SUBCUTANEOUS TISSUE: ICD-10-CM

## 2024-08-29 PROCEDURE — 99024 POSTOP FOLLOW-UP VISIT: CPT

## 2024-08-29 NOTE — REASON FOR VISIT
[Spouse] : spouse [de-identified] :  Left pterional craniotomy for clipping of the M3 aneurysm with intr-op angiogram [de-identified] : 7/23/24

## 2024-08-29 NOTE — ASSESSMENT
[FreeTextEntry1] : I, Dr. Callejas, personally performed the evaluation and management (E/M) services for this established patient who presents today s/p surgery for aneurysm clipping. That E/M includes conducting the examination, assessing all new/exacerbated conditions, and establishing a new plan of care. Today, my ACP, Zulma Whaley, was here to observe my evaluation and management services for this new problem/exacerbated condition to be followed going forward.  PLAN: -ok to drive - ok to color hair - rest when tired - next year no imaging needed

## 2024-08-29 NOTE — PHYSICAL EXAM
[General Appearance - Alert] : alert [General Appearance - In No Acute Distress] : in no acute distress [Clean] : clean [Dry] : dry [Healing Well] : healing well [No Drainage] : without drainage [Normal Skin] : normal [Oriented To Time, Place, And Person] : oriented to person, place, and time [Affect] : the affect was normal [Memory Remote] : remote memory was not impaired [Memory Recent] : recent memory was not impaired [Person] : oriented to person [Place] : oriented to place [Time] : oriented to time [Short Term Intact] : short term memory intact [Remote Intact] : remote memory intact [Span Intact] : the attention span was normal [Concentration Intact] : normal concentrating ability [Visual Intact] : visual attention was ~T not ~L decreased [Fluency] : fluency intact [Comprehension] : comprehension intact [Reading] : reading intact [Current Events] : adequate knowledge of current events [Vocabulary] : adequate range of vocabulary [Cranial Nerves Optic (II)] : visual acuity intact bilaterally,  pupils equal round and reactive to light [Cranial Nerves Oculomotor (III)] : extraocular motion intact [Cranial Nerves Trigeminal (V)] : facial sensation intact symmetrically [Cranial Nerves Facial (VII)] : face symmetrical [Cranial Nerves Vestibulocochlear (VIII)] : hearing was intact bilaterally [Cranial Nerves Accessory (XI - Cranial And Spinal)] : head turning and shoulder shrug symmetric [Cranial Nerves Hypoglossal (XII)] : there was no tongue deviation with protrusion [Motor Strength] : muscle strength was normal in all four extremities [Motor Tone] : muscle tone was normal in all four extremities [Involuntary Movements] : no involuntary movements were seen [No Muscle Atrophy] : normal bulk in all four extremities [Abnormal Walk] : normal gait [Extraocular Movements] : extraocular movements were intact [PERRL With Normal Accommodation] : pupils were equal in size, round, reactive to light, with normal accommodation [Full Visual Field] : full visual field [Intact] : intact [Palpable Crepitus] : without palpable crepitus [Erythema] : not erythematous [Tender] : not tender [Warm] : not warm [Indurated] : not indurated

## 2024-08-29 NOTE — REASON FOR VISIT
[Spouse] : spouse [de-identified] :  Left pterional craniotomy for clipping of the M3 aneurysm with intr-op angiogram [de-identified] : 7/23/24

## 2024-08-29 NOTE — HISTORY OF PRESENT ILLNESS
[FreeTextEntry1] : The patient is a 78 years old lady with a known incidental unruptured M3 aneurysm on the left side.  The unusual distal localization along with documented increase in size over the last years (from 3 to 4 mm)  led to the indication for treatment in this patient, which is in very good clinical condition.  A previous endovascular treatment attempt at an outside institution was unsuccessful.  We discussed the treatment options with the patient, and agreed with  her to proceed with the second endovascular treatment attempt.  On July 22, 2024, we performed the angiogram and could get to the distal aneurysm with a micro catheter.  However, coiling of the aneurysm was not possible due to the wide neck, and repetitive  coil protrusion in the parent vessel.  We decided then, after discussion with the patient and her family, to move ahead with the surgical clipping.    TTM 7/31/24: Pt is home.  is with her. No bleeding, no drainage, states incision and drain area is no red or swollen. no fever. eating small amounts. Drinking, urinating and having BM well. She is walking around her home slowly. Denies pain or any neuro changes.  8/7/24: Pt reports she is tired and has not gotten her energy back. She reports she is eating, drinking, urinating and having normal BM. PT is coming to her home once a week  TODAY 8/29/24: reports feeling tired. there are no other issues or complaints

## 2024-08-29 NOTE — ADDENDUM
[FreeTextEntry1] : Patient Name: Tali Donohue  Chief Complaint (CC): - Patient is experiencing fatigue and sleep disruption a month after surgery.  History of Present Illness: - Patient, a 69 plus year-old female, reported feeling tired by the middle of the day, a month post surgery. She conveyed that her sleep has been disrupted due to anxiety over the surgery and recovery. She wishes to get back to her normal lifestyle, which includes playing with her grandkids and going to the [employer]. She also expressed concern over a skin lesion that had been grafted three years ago. She mentioned she follows up every six months for this particular condition.  Past Medical History (PMH): - Patient had a prior skin graft three years ago, which she follows up on every six months. She has recently undergone surgery for an aneurysm.  Family History (FH): - Data not available.  Social History (SH): - The patient lives in [place] and enjoys an active lifestyle, including playing with her grandchildren.  Medications: - Data not available.  Allergies: - Data not available.  Review of Systems (ROS): - Data not available.  Physical Examination (PE): - Azalia noted a confusion related to a bump on the patient's head which was clarified as a past skin lesion and not related to the recent surgery. Rest of the physical examination data is not available.  Laboratory/Data Review: - Angiogram images pre and post surgery were reviewed. The images show that the aneurysm has been successfully eliminated with a clip placed. The procedure led to the cessation of blood flow to the aneurysm and it involuted over time.  Assessment: - The patient's post-surgery recovery is proceeding as expected, although she is experiencing tiredness and disrupted sleep patterns. This is normal considering the anxiety following the surgery and the body needing time to recover. The patient's aneurysm has been successfully treated as it was a manageable size and in a location conducive to safe intervention. Any concern over a skin lesion was unfounded as it pertains to an old skin graft, not the current head surgery.  Plan: - The patient is encouraged to engage in normal physical activities, but in a moderated fashion. She has also been advised to take plenty of rest, eat well and avoid overdoing activities. She has been reassured that her condition will improve over time and that she needs to have patience during the recovery period. The patient is allowed to drive in the morning and go to the [employer] [facility]. Any changes or concerns should be promptly reported.  Preventive Health: - Data not available.

## 2024-08-29 NOTE — PHYSICAL EXAM
[General Appearance - Alert] : alert [General Appearance - In No Acute Distress] : in no acute distress [Clean] : clean [Dry] : dry [Healing Well] : healing well [No Drainage] : without drainage [Normal Skin] : normal [Oriented To Time, Place, And Person] : oriented to person, place, and time [Affect] : the affect was normal [Memory Recent] : recent memory was not impaired [Memory Remote] : remote memory was not impaired [Person] : oriented to person [Place] : oriented to place [Time] : oriented to time [Short Term Intact] : short term memory intact [Remote Intact] : remote memory intact [Span Intact] : the attention span was normal [Concentration Intact] : normal concentrating ability [Visual Intact] : visual attention was ~T not ~L decreased [Fluency] : fluency intact [Comprehension] : comprehension intact [Reading] : reading intact [Current Events] : adequate knowledge of current events [Vocabulary] : adequate range of vocabulary [Cranial Nerves Oculomotor (III)] : extraocular motion intact [Cranial Nerves Optic (II)] : visual acuity intact bilaterally,  pupils equal round and reactive to light [Cranial Nerves Trigeminal (V)] : facial sensation intact symmetrically [Cranial Nerves Facial (VII)] : face symmetrical [Cranial Nerves Vestibulocochlear (VIII)] : hearing was intact bilaterally [Cranial Nerves Accessory (XI - Cranial And Spinal)] : head turning and shoulder shrug symmetric [Cranial Nerves Hypoglossal (XII)] : there was no tongue deviation with protrusion [Motor Strength] : muscle strength was normal in all four extremities [Motor Tone] : muscle tone was normal in all four extremities [Involuntary Movements] : no involuntary movements were seen [No Muscle Atrophy] : normal bulk in all four extremities [Abnormal Walk] : normal gait [PERRL With Normal Accommodation] : pupils were equal in size, round, reactive to light, with normal accommodation [Extraocular Movements] : extraocular movements were intact [Full Visual Field] : full visual field [Intact] : intact [Palpable Crepitus] : without palpable crepitus [Erythema] : not erythematous [Tender] : not tender [Warm] : not warm [Indurated] : not indurated

## 2024-09-04 LAB
T3 SERPL-MCNC: 100 NG/DL
T3FREE SERPL-MCNC: 2.69 PG/ML
T4 FREE SERPL-MCNC: 1.6 NG/DL
T4 SERPL-MCNC: 9 UG/DL
TSH SERPL-ACNC: 1.87 UIU/ML